# Patient Record
Sex: MALE | URBAN - METROPOLITAN AREA
[De-identification: names, ages, dates, MRNs, and addresses within clinical notes are randomized per-mention and may not be internally consistent; named-entity substitution may affect disease eponyms.]

---

## 2022-12-05 ENCOUNTER — REFRACTIVE (OUTPATIENT)
Dept: URBAN - METROPOLITAN AREA CLINIC 4 | Facility: CLINIC | Age: 22
End: 2022-12-05

## 2022-12-05 DIAGNOSIS — H53.8 OTHER VISUAL DISTURBANCES: Primary | ICD-10-CM

## 2022-12-05 ASSESSMENT — KERATOMETRY
OS: 41.13
OD: 41.38

## 2022-12-05 ASSESSMENT — INTRAOCULAR PRESSURE
OS: 20
OD: 13

## 2022-12-15 ENCOUNTER — REFRACTIVE (OUTPATIENT)
Dept: URBAN - METROPOLITAN AREA CLINIC 4 | Facility: CLINIC | Age: 22
End: 2022-12-15

## 2022-12-15 DIAGNOSIS — H53.8 OTHER VISUAL DISTURBANCES: Primary | ICD-10-CM

## 2022-12-15 RX ORDER — DIAZEPAM 5 MG/1
5 MG TABLET ORAL
Qty: 2 | Refills: 0 | Status: ACTIVE
Start: 2022-12-15

## 2022-12-15 ASSESSMENT — VISUAL ACUITY
OD: 20/20
OS: 20/20

## 2022-12-15 ASSESSMENT — KERATOMETRY
OD: 41.25
OS: 41.00

## 2022-12-17 ENCOUNTER — POST-OPERATIVE VISIT (OUTPATIENT)
Dept: URBAN - METROPOLITAN AREA CLINIC 4 | Facility: CLINIC | Age: 22
End: 2022-12-17

## 2022-12-17 DIAGNOSIS — Z48.810 ENCOUNTER FOR SURGICAL AFTERCARE FOLLOWING SURGERY ON A SENSE ORGAN: Primary | ICD-10-CM

## 2022-12-17 PROCEDURE — 99024 POSTOP FOLLOW-UP VISIT: CPT | Performed by: OPHTHALMOLOGY

## 2022-12-17 NOTE — IMPRESSION/PLAN
Impression: S/P LASIK OU - 1 Day. Encounter for surgical aftercare following surgery on a sense organ  Z48.810. Plan: POD#1 s/p LASIK both eyes- healing well, flap intact. Continue Pred-Moxi eye drops QID until next visit. Cont. Vitamin C 1,000mg PO QD, sunglasses protection and preservative free artificial tears [id number] while awake until next visit. Patient instructed to continue precautions of not rubbing the eyes, to sleep with protective goggles until the next visit. F/U in 1 week, sooner PRN.

## 2022-12-29 ENCOUNTER — POST-OPERATIVE VISIT (OUTPATIENT)
Dept: URBAN - METROPOLITAN AREA CLINIC 4 | Facility: CLINIC | Age: 22
End: 2022-12-29

## 2022-12-29 DIAGNOSIS — Z48.810 ENCOUNTER FOR SURGICAL AFTERCARE FOLLOWING SURGERY ON A SENSE ORGAN: Primary | ICD-10-CM

## 2022-12-29 PROCEDURE — 99024 POSTOP FOLLOW-UP VISIT: CPT | Performed by: OPHTHALMOLOGY

## 2022-12-29 NOTE — IMPRESSION/PLAN
Impression: S/P LASIK - [facility] OU - 8 Days. Encounter for surgical aftercare following surgery on a sense organ  Z48.810. Plan: POW#1 s/p LASIK both eyes - healing well, flap intact. Cont. Pred-Moxi QD until runs out. Continue Vitamin C 1,000mg PO QD, sunglasses protection and preservative free artificial tears but decrease to every 1 hour while awake and plan to taper one hour per week until next visit. Stop sleeping with goggles at night and can start to gently rub the eyes. F/U in 3-4 weeks, sooner PRN.

## 2023-07-12 ENCOUNTER — POST-OPERATIVE VISIT (OUTPATIENT)
Dept: URBAN - METROPOLITAN AREA CLINIC 4 | Facility: CLINIC | Age: 23
End: 2023-07-12

## 2023-07-12 DIAGNOSIS — Z48.810 ENCOUNTER FOR SURGICAL AFTERCARE FOLLOWING SURGERY ON A SENSE ORGAN: Primary | ICD-10-CM

## 2023-07-12 PROCEDURE — 99024 POSTOP FOLLOW-UP VISIT: CPT | Performed by: OPHTHALMOLOGY

## 2023-07-12 ASSESSMENT — INTRAOCULAR PRESSURE
OS: 13
OD: 12

## 2023-07-12 NOTE — IMPRESSION/PLAN
Impression: S/P LASIK - [facility] OU - 208 Days. Encounter for surgical aftercare following surgery on a sense organ  Z48.810. Plan: POM#6 s/p LASIK both eyes - well healed, patient happy with results. Cont. sunglasses protection and artificial tears prn.  F/U yearly for a repeat dilated exam.